# Patient Record
Sex: FEMALE | Employment: OTHER | URBAN - METROPOLITAN AREA
[De-identification: names, ages, dates, MRNs, and addresses within clinical notes are randomized per-mention and may not be internally consistent; named-entity substitution may affect disease eponyms.]

---

## 2022-04-30 ENCOUNTER — TELEPHONE ENCOUNTER (OUTPATIENT)
Dept: URBAN - METROPOLITAN AREA CLINIC 121 | Facility: CLINIC | Age: 87
End: 2022-04-30

## 2022-05-01 ENCOUNTER — TELEPHONE ENCOUNTER (OUTPATIENT)
Dept: URBAN - METROPOLITAN AREA CLINIC 121 | Facility: CLINIC | Age: 87
End: 2022-05-01

## 2022-05-01 RX ORDER — CANDESARTAN CILEXETIL 16 MG/1
TABLET ORAL
Status: ACTIVE | COMMUNITY
Start: 2007-10-09

## 2022-05-01 RX ORDER — CARVEDILOL 12.5 MG/1
TABLET, FILM COATED ORAL
Status: ACTIVE | COMMUNITY
Start: 2007-10-09

## 2022-05-01 RX ORDER — SIMVASTATIN 10 MG/1
TABLET, FILM COATED ORAL
Status: ACTIVE | COMMUNITY
Start: 2007-10-09

## 2023-10-26 ENCOUNTER — OFFICE VISIT (OUTPATIENT)
Dept: URBAN - METROPOLITAN AREA CLINIC 82 | Facility: CLINIC | Age: 88
End: 2023-10-26
Payer: MEDICARE

## 2023-10-26 VITALS
SYSTOLIC BLOOD PRESSURE: 176 MMHG | HEART RATE: 77 BPM | HEIGHT: 67 IN | DIASTOLIC BLOOD PRESSURE: 71 MMHG | TEMPERATURE: 97.2 F | BODY MASS INDEX: 23.39 KG/M2 | WEIGHT: 149 LBS

## 2023-10-26 DIAGNOSIS — R19.5 LOOSE STOOLS: ICD-10-CM

## 2023-10-26 DIAGNOSIS — D64.89 OTHER SPECIFIED ANEMIAS: ICD-10-CM

## 2023-10-26 PROCEDURE — 99203 OFFICE O/P NEW LOW 30 MIN: CPT | Performed by: INTERNAL MEDICINE

## 2023-10-26 RX ORDER — METFORMIN HYDROCHLORIDE 850 MG/1
1 TABLET WITH A MEAL TABLET, FILM COATED ORAL ONCE A DAY
Status: ACTIVE | COMMUNITY

## 2023-10-26 RX ORDER — CHOLESTYRAMINE 4 G/9G
1 PACKET MIXED WITH WATER OR NON-CARBONATED DRINK POWDER, FOR SUSPENSION ORAL ONCE A DAY
Qty: 30 | Refills: 3 | OUTPATIENT
Start: 2023-10-26

## 2023-10-26 RX ORDER — GABAPENTIN 100 MG/1
1 CAPSULE CAPSULE ORAL ONCE A DAY
Status: ACTIVE | COMMUNITY

## 2023-10-26 RX ORDER — LOSARTAN POTASSIUM 100 MG/1
1 TABLET TABLET ORAL ONCE A DAY
Status: ACTIVE | COMMUNITY

## 2023-10-26 RX ORDER — SIMVASTATIN 10 MG/1
TABLET, FILM COATED ORAL
Status: DISCONTINUED | COMMUNITY
Start: 2007-10-09

## 2023-10-26 RX ORDER — PANTOPRAZOLE SODIUM 40 MG/1
1 TABLET TABLET, DELAYED RELEASE ORAL ONCE A DAY
Status: ACTIVE | COMMUNITY

## 2023-10-26 RX ORDER — CANDESARTAN CILEXETIL 16 MG/1
TABLET ORAL
Status: DISCONTINUED | COMMUNITY
Start: 2007-10-09

## 2023-10-26 RX ORDER — CARVEDILOL 12.5 MG/1
TABLET, FILM COATED ORAL
Status: DISCONTINUED | COMMUNITY
Start: 2007-10-09

## 2023-10-26 RX ORDER — HYDRALAZINE HYDROCHLORIDE 100 MG/1
1 TABLET WITH FOOD TABLET, FILM COATED ORAL TWICE A DAY
Status: ACTIVE | COMMUNITY

## 2023-10-26 NOTE — HPI-TODAY'S VISIT:
CKDIII. reports CHERRIE, no gross bleeding. EGD/Colonoscopy '07. GB surgery couple months ago, then developed diarrhea 2-3 times per day, has had low sodium. recent labs and stool test last week w pcp, not avail. reports CT abd/pel at [facility] 9/2023 not avail. No abd pain. ASA 81, no other nsaids.

## 2023-10-27 LAB
A/G RATIO: 1.9
ALBUMIN: 4.6
ALKALINE PHOSPHATASE: 55
ALT (SGPT): 9
AST (SGOT): 12
BILIRUBIN, TOTAL: 0.5
BUN/CREATININE RATIO: 22
BUN: 24
CALCIUM: 9.2
CARBON DIOXIDE, TOTAL: 23
CHLORIDE: 106
CREATININE: 1.1
EGFR: 47
FERRITIN, SERUM: 37
FOLATE (FOLIC ACID), SERUM: 14.2
GLOBULIN, TOTAL: 2.4
GLUCOSE: 102
HEMATOCRIT: 30.5
HEMOGLOBIN: 10.3
IMMUNOGLOBULIN A, QN, SERUM: 211
INTERPRETATION: (no result)
IRON BIND.CAP.(TIBC): 298
IRON SATURATION: 18
IRON: 54
MCH: 32.4
MCHC: 33.8
MCV: 95.9
MPV: 10.2
PLATELET COUNT: 196
POTASSIUM: 4.1
PROTEIN, TOTAL: 7
RDW: 12.6
RED BLOOD CELL COUNT: 3.18
SODIUM: 139
T-TRANSGLUTAMINASE (TTG) IGA: <1
TSH W/REFLEX TO FT4: 4.26
VITAMIN B12: 327
WHITE BLOOD CELL COUNT: 4.3

## 2023-10-30 ENCOUNTER — TELEPHONE ENCOUNTER (OUTPATIENT)
Dept: URBAN - METROPOLITAN AREA CLINIC 115 | Facility: CLINIC | Age: 88
End: 2023-10-30

## 2023-10-30 RX ORDER — FERROUS SULFATE 325(65) MG
1 TABLET TABLET ORAL
Qty: 12 | Refills: 1 | OUTPATIENT
Start: 2023-10-30

## 2023-12-27 ENCOUNTER — LAB OUTSIDE AN ENCOUNTER (OUTPATIENT)
Dept: URBAN - METROPOLITAN AREA CLINIC 115 | Facility: CLINIC | Age: 88
End: 2023-12-27

## 2024-01-03 LAB

## 2024-01-08 ENCOUNTER — OFFICE VISIT (OUTPATIENT)
Dept: URBAN - METROPOLITAN AREA CLINIC 82 | Facility: CLINIC | Age: 89
End: 2024-01-08

## 2024-01-08 RX ORDER — CHOLESTYRAMINE 4 G/9G
1 PACKET MIXED WITH WATER OR NON-CARBONATED DRINK POWDER, FOR SUSPENSION ORAL ONCE A DAY
Qty: 30 | Refills: 3 | COMMUNITY
Start: 2023-10-26

## 2024-01-08 RX ORDER — HYDRALAZINE HYDROCHLORIDE 100 MG/1
1 TABLET WITH FOOD TABLET, FILM COATED ORAL TWICE A DAY
COMMUNITY

## 2024-01-08 RX ORDER — GABAPENTIN 100 MG/1
1 CAPSULE CAPSULE ORAL ONCE A DAY
COMMUNITY

## 2024-01-08 RX ORDER — FERROUS SULFATE 325(65) MG
1 TABLET TABLET ORAL
Qty: 12 | Refills: 1 | COMMUNITY
Start: 2023-10-30

## 2024-01-08 RX ORDER — METFORMIN HYDROCHLORIDE 850 MG/1
1 TABLET WITH A MEAL TABLET, FILM COATED ORAL ONCE A DAY
COMMUNITY

## 2024-01-08 RX ORDER — LOSARTAN POTASSIUM 100 MG/1
1 TABLET TABLET ORAL ONCE A DAY
COMMUNITY

## 2024-01-08 RX ORDER — PANTOPRAZOLE SODIUM 40 MG/1
1 TABLET TABLET, DELAYED RELEASE ORAL ONCE A DAY
COMMUNITY

## 2024-02-01 ENCOUNTER — OV EP (OUTPATIENT)
Dept: URBAN - METROPOLITAN AREA CLINIC 82 | Facility: CLINIC | Age: 89
End: 2024-02-01
Payer: MEDICARE

## 2024-02-01 VITALS
HEART RATE: 67 BPM | WEIGHT: 153 LBS | SYSTOLIC BLOOD PRESSURE: 190 MMHG | DIASTOLIC BLOOD PRESSURE: 65 MMHG | HEIGHT: 67 IN | TEMPERATURE: 98.1 F | BODY MASS INDEX: 24.01 KG/M2

## 2024-02-01 DIAGNOSIS — K58.2 IRRITABLE BOWEL SYNDROME WITH BOTH CONSTIPATION AND DIARRHEA: ICD-10-CM

## 2024-02-01 DIAGNOSIS — D64.89 ANEMIA DUE TO OTHER CAUSE: ICD-10-CM

## 2024-02-01 PROBLEM — 10743008: Status: ACTIVE | Noted: 2024-02-01

## 2024-02-01 PROCEDURE — 99213 OFFICE O/P EST LOW 20 MIN: CPT | Performed by: INTERNAL MEDICINE

## 2024-02-01 RX ORDER — HYDRALAZINE HYDROCHLORIDE 100 MG/1
1 TABLET WITH FOOD TABLET, FILM COATED ORAL TWICE A DAY
Status: ACTIVE | COMMUNITY

## 2024-02-01 RX ORDER — GABAPENTIN 100 MG/1
1 CAPSULE CAPSULE ORAL ONCE A DAY
Status: ACTIVE | COMMUNITY

## 2024-02-01 RX ORDER — METFORMIN HYDROCHLORIDE 850 MG/1
1 TABLET WITH A MEAL TABLET, FILM COATED ORAL ONCE A DAY
Status: ACTIVE | COMMUNITY

## 2024-02-01 RX ORDER — LOSARTAN POTASSIUM 100 MG/1
1 TABLET TABLET ORAL ONCE A DAY
Status: ACTIVE | COMMUNITY

## 2024-02-01 RX ORDER — PANTOPRAZOLE SODIUM 40 MG/1
1 TABLET TABLET, DELAYED RELEASE ORAL ONCE A DAY
Status: ACTIVE | COMMUNITY

## 2024-02-01 RX ORDER — FERROUS SULFATE 325(65) MG
1 TABLET TABLET ORAL
Qty: 12 | Refills: 1 | Status: ACTIVE | COMMUNITY
Start: 2023-10-30

## 2024-02-01 RX ORDER — CHOLESTYRAMINE 4 G/9G
1 PACKET MIXED WITH WATER OR NON-CARBONATED DRINK POWDER, FOR SUSPENSION ORAL ONCE A DAY
Qty: 30 | Refills: 3 | Status: ACTIVE | COMMUNITY
Start: 2023-10-26

## 2024-02-01 NOTE — PHYSICAL EXAM HENT:
Head, normocephalic, atraumatic, Face, Face within normal limits, Ears, External ears within normal limits
EOMI; PERRL; no drainage or redness

## 2024-02-01 NOTE — HPI-TODAY'S VISIT:
On oral iron chronic. Recent constipation trouble, stopped cholestyramine. Prior labs w Slight anemia, with borderline b12/iron, sent repletion. GPP stool w borderline inflammation. Questran trial. Prior hx: CKDIII. reports CHERRIE, no gross bleeding. EGD/Colonoscopy '07. GB surgery couple months ago, then developed diarrhea 2-3 times per day, has had low sodium. recent labs and stool test last week w pcp, not avail. reports CT abd/pel at [facility] 9/2023 not avail. No abd pain. ASA 81, no other nsaids.

## 2024-02-02 LAB
A/G RATIO: 1.8
ALBUMIN: 4.4
ALKALINE PHOSPHATASE: 81
ALT (SGPT): 58
AST (SGOT): 36
BILIRUBIN, TOTAL: 0.5
BUN/CREATININE RATIO: 24
BUN: 31
CALCIUM: 9.3
CARBON DIOXIDE, TOTAL: 26
CHLORIDE: 105
CREATININE: 1.28
EGFR: 40
FERRITIN, SERUM: 29
FOLATE (FOLIC ACID), SERUM: 20.7
GLOBULIN, TOTAL: 2.4
GLUCOSE: 106
HEMATOCRIT: 29.6
HEMOGLOBIN: 9.7
IRON BIND.CAP.(TIBC): 292
IRON SATURATION: 18
IRON: 53
MCH: 31.7
MCHC: 32.8
MCV: 96.7
MPV: 10.7
PLATELET COUNT: 188
POTASSIUM: 3.8
PROTEIN, TOTAL: 6.8
RDW: 12.3
RED BLOOD CELL COUNT: 3.06
SODIUM: 143
VITAMIN B12: 328
WHITE BLOOD CELL COUNT: 4.3

## 2024-05-29 ENCOUNTER — DASHBOARD ENCOUNTERS (OUTPATIENT)
Age: 89
End: 2024-05-29

## 2024-05-30 ENCOUNTER — OFFICE VISIT (OUTPATIENT)
Dept: URBAN - METROPOLITAN AREA CLINIC 82 | Facility: CLINIC | Age: 89
End: 2024-05-30
Payer: MEDICARE

## 2024-05-30 VITALS
BODY MASS INDEX: 24.58 KG/M2 | HEIGHT: 67 IN | SYSTOLIC BLOOD PRESSURE: 124 MMHG | DIASTOLIC BLOOD PRESSURE: 41 MMHG | HEART RATE: 56 BPM | WEIGHT: 156.6 LBS | TEMPERATURE: 97.3 F

## 2024-05-30 DIAGNOSIS — R79.89 ABNORMAL LFTS: ICD-10-CM

## 2024-05-30 DIAGNOSIS — E61.1 IRON DEFICIENCY: ICD-10-CM

## 2024-05-30 DIAGNOSIS — K58.1 IRRITABLE BOWEL SYNDROME WITH CONSTIPATION: ICD-10-CM

## 2024-05-30 DIAGNOSIS — E53.8 B12 DEFICIENCY: ICD-10-CM

## 2024-05-30 PROBLEM — 440630006: Status: ACTIVE | Noted: 2024-05-30

## 2024-05-30 PROCEDURE — 99213 OFFICE O/P EST LOW 20 MIN: CPT | Performed by: INTERNAL MEDICINE

## 2024-05-30 RX ORDER — METFORMIN HYDROCHLORIDE 850 MG/1
1 TABLET WITH A MEAL TABLET, FILM COATED ORAL ONCE A DAY
Status: ACTIVE | COMMUNITY

## 2024-05-30 RX ORDER — POLYETHYLENE GLYCOL 3350 17 G/17G
1 SCOOP MIXED WITH 8 OUNCES OF FLUID POWDER, FOR SOLUTION ORAL
Qty: 30 | Refills: 1 | OUTPATIENT
Start: 2024-05-30 | End: 2024-07-29

## 2024-05-30 RX ORDER — PANTOPRAZOLE SODIUM 40 MG/1
1 TABLET TABLET, DELAYED RELEASE ORAL ONCE A DAY
Status: ACTIVE | COMMUNITY

## 2024-05-30 RX ORDER — HYDRALAZINE HYDROCHLORIDE 100 MG/1
1 TABLET WITH FOOD TABLET, FILM COATED ORAL TWICE A DAY
Status: ACTIVE | COMMUNITY

## 2024-05-30 RX ORDER — LOSARTAN POTASSIUM 100 MG/1
1 TABLET TABLET ORAL ONCE A DAY
Status: ACTIVE | COMMUNITY

## 2024-05-30 RX ORDER — FERROUS SULFATE 325(65) MG
1 TABLET TABLET ORAL
Qty: 12 | Refills: 1 | Status: ACTIVE | COMMUNITY
Start: 2023-10-30

## 2024-05-30 RX ORDER — GABAPENTIN 100 MG/1
1 CAPSULE CAPSULE ORAL ONCE A DAY
Status: ACTIVE | COMMUNITY

## 2024-05-30 RX ORDER — CHOLESTYRAMINE 4 G/9G
1 PACKET MIXED WITH WATER OR NON-CARBONATED DRINK POWDER, FOR SUSPENSION ORAL ONCE A DAY
Qty: 30 | Refills: 3 | Status: ACTIVE | COMMUNITY
Start: 2023-10-26

## 2024-05-31 ENCOUNTER — TELEPHONE ENCOUNTER (OUTPATIENT)
Dept: URBAN - METROPOLITAN AREA CLINIC 115 | Facility: CLINIC | Age: 89
End: 2024-05-31

## 2024-05-31 LAB
A/G RATIO: 2
ALBUMIN: 4.4
ALKALINE PHOSPHATASE: 70
ALT (SGPT): 19
AST (SGOT): 17
BILIRUBIN, TOTAL: 0.6
BUN/CREATININE RATIO: 18
BUN: 34
CALCIUM: 8.9
CARBON DIOXIDE, TOTAL: 25
CHLORIDE: 102
CREATINE KINASE,TOTAL: 62
CREATININE: 1.91
EGFR: 24
FERRITIN, SERUM: 28
GLOBULIN, TOTAL: 2.2
GLUCOSE: 136
HEMATOCRIT: 27.1
HEMOGLOBIN: 8.6
HEPATITIS B CORE AB TOTAL: (no result)
HEPATITIS B SURFACE AB IMMUNITY, QN: 16
HEPATITIS B SURFACE ANTIGEN: (no result)
HEPATITIS C ANTIBODY: (no result)
IRON BIND.CAP.(TIBC): 320
IRON SATURATION: 14
IRON: 45
MCH: 30.9
MCHC: 31.7
MCV: 97.5
MPV: 10.3
PLATELET COUNT: 184
POTASSIUM: 4.8
PROTEIN, TOTAL: 6.6
RDW: 12.7
RED BLOOD CELL COUNT: 2.78
SODIUM: 140
VITAMIN B12: 338
WHITE BLOOD CELL COUNT: 4.8

## 2024-05-31 RX ORDER — FERROUS SULFATE 325(65) MG
1 TABLET TABLET ORAL
Qty: 12 | Refills: 1
Start: 2023-10-30

## 2024-08-08 ENCOUNTER — OFFICE VISIT (OUTPATIENT)
Dept: URBAN - METROPOLITAN AREA CLINIC 82 | Facility: CLINIC | Age: 89
End: 2024-08-08
Payer: MEDICARE

## 2024-08-08 VITALS
DIASTOLIC BLOOD PRESSURE: 62 MMHG | TEMPERATURE: 98.1 F | BODY MASS INDEX: 24.74 KG/M2 | WEIGHT: 157.6 LBS | HEART RATE: 57 BPM | SYSTOLIC BLOOD PRESSURE: 134 MMHG | HEIGHT: 67 IN

## 2024-08-08 DIAGNOSIS — N28.9 RENAL INSUFFICIENCY: ICD-10-CM

## 2024-08-08 DIAGNOSIS — E53.8 B12 DEFICIENCY: ICD-10-CM

## 2024-08-08 DIAGNOSIS — D50.8 OTHER IRON DEFICIENCY ANEMIA: ICD-10-CM

## 2024-08-08 PROBLEM — 87522002: Status: ACTIVE | Noted: 2024-08-08

## 2024-08-08 PROCEDURE — 99213 OFFICE O/P EST LOW 20 MIN: CPT | Performed by: INTERNAL MEDICINE

## 2024-08-08 RX ORDER — HYDRALAZINE HYDROCHLORIDE 100 MG/1
1 TABLET WITH FOOD TABLET, FILM COATED ORAL TWICE A DAY
Status: ACTIVE | COMMUNITY

## 2024-08-08 RX ORDER — LOSARTAN POTASSIUM 100 MG/1
1 TABLET TABLET ORAL ONCE A DAY
Status: ACTIVE | COMMUNITY

## 2024-08-08 RX ORDER — GABAPENTIN 100 MG/1
1 CAPSULE CAPSULE ORAL ONCE A DAY
Status: ACTIVE | COMMUNITY

## 2024-08-08 RX ORDER — PANTOPRAZOLE SODIUM 40 MG/1
1 TABLET TABLET, DELAYED RELEASE ORAL ONCE A DAY
Status: ACTIVE | COMMUNITY

## 2024-08-08 RX ORDER — METFORMIN HYDROCHLORIDE 850 MG/1
1 TABLET WITH A MEAL TABLET, FILM COATED ORAL ONCE A DAY
Status: ACTIVE | COMMUNITY

## 2024-08-08 RX ORDER — CHOLESTYRAMINE 4 G/9G
1 PACKET MIXED WITH WATER OR NON-CARBONATED DRINK POWDER, FOR SUSPENSION ORAL ONCE A DAY
Qty: 30 | Refills: 3 | Status: ACTIVE | COMMUNITY
Start: 2023-10-26

## 2024-08-08 RX ORDER — FERROUS SULFATE 325(65) MG
1 TABLET TABLET ORAL
Qty: 12 | Refills: 1 | Status: ACTIVE | COMMUNITY
Start: 2023-10-30

## 2024-08-08 NOTE — HPI-TODAY'S VISIT:
Hgb 8.6, low iron and B12, PO repletion sent. Cr 1.9.  Prior hx 5/2024: Labs reviewed, low normal B12, just started PO b12, normal iron, off past 2 months. moderate anemia. elevation in kidney creatinine, sees pcp/nephrology. mild elevation in ast/alt liver enzymes, will recheck. Prior hx 2/2024: On oral iron chronic. Recent constipation trouble, stopped cholestyramine. Prior labs w Slight anemia, with borderline b12/iron, sent repletion. GPP stool w borderline inflammation. Questran trial. Prior hx: CKDIII. reports CHERRIE, no gross bleeding. EGD/Colonoscopy '07. GB surgery couple months ago, then developed diarrhea 2-3 times per day, has had low sodium. recent labs and stool test last week w pcp, not avail. CT abd/pel at [facility] 3/2024, no GI abnml. No abd pain. ASA 81, no other nsaids.

## 2024-08-13 LAB
A/G RATIO: 1.8
ALBUMIN: 4.2
ALKALINE PHOSPHATASE: 86
ALT (SGPT): 16
ANTIPARIETAL CELL ANTIBODY: 63.2
AST (SGOT): 16
BILIRUBIN, TOTAL: 0.5
BUN/CREATININE RATIO: 26
BUN: 48
CALCIUM: 8.8
CARBON DIOXIDE, TOTAL: 21
CHLORIDE: 107
CREATININE: 1.82
EGFR: 26
FERRITIN, SERUM: 20
GLOBULIN, TOTAL: 2.3
GLUCOSE: 122
HEMATOCRIT: 31
HEMOGLOBIN: 9.5
INTRINSIC FACTOR BLOCKING: NEGATIVE
IRON BIND.CAP.(TIBC): 322
IRON SATURATION: 15
IRON: 47
MCH: 31.1
MCHC: 30.6
MCV: 101.6
MPV: 9.6
PLATELET COUNT: 186
POTASSIUM: 4.4
PROTEIN, TOTAL: 6.5
RDW: 12.9
RED BLOOD CELL COUNT: 3.05
SODIUM: 141
VITAMIN B12: 299
WHITE BLOOD CELL COUNT: 4.5

## 2024-08-15 ENCOUNTER — TELEPHONE ENCOUNTER (OUTPATIENT)
Dept: URBAN - METROPOLITAN AREA CLINIC 115 | Facility: CLINIC | Age: 89
End: 2024-08-15

## 2024-08-15 ENCOUNTER — LAB OUTSIDE AN ENCOUNTER (OUTPATIENT)
Dept: URBAN - METROPOLITAN AREA CLINIC 115 | Facility: CLINIC | Age: 89
End: 2024-08-15

## 2024-08-15 RX ORDER — FERROUS SULFATE 325(65) MG
1 TABLET TABLET ORAL
Qty: 12 | Refills: 1
Start: 2023-10-30

## 2024-10-24 ENCOUNTER — OFFICE VISIT (OUTPATIENT)
Dept: URBAN - METROPOLITAN AREA CLINIC 82 | Facility: CLINIC | Age: 89
End: 2024-10-24

## 2024-10-24 RX ORDER — HYDRALAZINE HYDROCHLORIDE 100 MG/1
1 TABLET WITH FOOD TABLET, FILM COATED ORAL TWICE A DAY
COMMUNITY

## 2024-10-24 RX ORDER — FERROUS SULFATE 325(65) MG
1 TABLET TABLET ORAL
Qty: 12 | Refills: 1 | COMMUNITY
Start: 2023-10-30

## 2024-10-24 RX ORDER — PANTOPRAZOLE SODIUM 40 MG/1
1 TABLET TABLET, DELAYED RELEASE ORAL ONCE A DAY
COMMUNITY

## 2024-10-24 RX ORDER — GABAPENTIN 100 MG/1
1 CAPSULE CAPSULE ORAL ONCE A DAY
COMMUNITY

## 2024-10-24 RX ORDER — METFORMIN HYDROCHLORIDE 850 MG/1
1 TABLET WITH A MEAL TABLET, FILM COATED ORAL ONCE A DAY
COMMUNITY

## 2024-10-24 RX ORDER — CHOLESTYRAMINE 4 G/9G
1 PACKET MIXED WITH WATER OR NON-CARBONATED DRINK POWDER, FOR SUSPENSION ORAL ONCE A DAY
Qty: 30 | Refills: 3 | COMMUNITY
Start: 2023-10-26

## 2024-10-24 RX ORDER — LOSARTAN POTASSIUM 100 MG/1
1 TABLET TABLET ORAL ONCE A DAY
COMMUNITY